# Patient Record
Sex: MALE | Race: WHITE | Employment: UNEMPLOYED | URBAN - METROPOLITAN AREA
[De-identification: names, ages, dates, MRNs, and addresses within clinical notes are randomized per-mention and may not be internally consistent; named-entity substitution may affect disease eponyms.]

---

## 2023-05-19 ENCOUNTER — APPOINTMENT (OUTPATIENT)
Dept: CT IMAGING | Age: 63
End: 2023-05-19
Payer: OTHER GOVERNMENT

## 2023-05-19 ENCOUNTER — HOSPITAL ENCOUNTER (EMERGENCY)
Age: 63
Discharge: HOME | End: 2023-05-19
Attending: EMERGENCY MEDICINE
Payer: OTHER GOVERNMENT

## 2023-05-19 VITALS
DIASTOLIC BLOOD PRESSURE: 84 MMHG | SYSTOLIC BLOOD PRESSURE: 154 MMHG | HEART RATE: 62 BPM | BODY MASS INDEX: 31.84 KG/M2 | OXYGEN SATURATION: 98 % | WEIGHT: 215 LBS | TEMPERATURE: 98.1 F | RESPIRATION RATE: 20 BRPM | HEIGHT: 69 IN

## 2023-05-19 DIAGNOSIS — S01.01XA LACERATION OF SCALP, INITIAL ENCOUNTER: Primary | ICD-10-CM

## 2023-05-19 PROCEDURE — 99284 EMERGENCY DEPT VISIT MOD MDM: CPT

## 2023-05-19 PROCEDURE — 90715 TDAP VACCINE 7 YRS/> IM: CPT | Performed by: PHYSICIAN ASSISTANT

## 2023-05-19 PROCEDURE — 6360000002 HC RX W HCPCS: Performed by: PHYSICIAN ASSISTANT

## 2023-05-19 PROCEDURE — 6370000000 HC RX 637 (ALT 250 FOR IP)

## 2023-05-19 PROCEDURE — 6370000000 HC RX 637 (ALT 250 FOR IP): Performed by: PHYSICIAN ASSISTANT

## 2023-05-19 PROCEDURE — 90471 IMMUNIZATION ADMIN: CPT | Performed by: PHYSICIAN ASSISTANT

## 2023-05-19 RX ORDER — GINSENG 100 MG
CAPSULE ORAL ONCE
Status: COMPLETED | OUTPATIENT
Start: 2023-05-19 | End: 2023-05-19

## 2023-05-19 RX ADMIN — Medication 3 ML: at 14:21

## 2023-05-19 RX ADMIN — TETANUS TOXOID, REDUCED DIPHTHERIA TOXOID AND ACELLULAR PERTUSSIS VACCINE, ADSORBED 0.5 ML: 5; 2.5; 8; 8; 2.5 SUSPENSION INTRAMUSCULAR at 14:54

## 2023-05-19 RX ADMIN — BACITRACIN: 500 OINTMENT TOPICAL at 14:55

## 2023-05-19 ASSESSMENT — PAIN - FUNCTIONAL ASSESSMENT: PAIN_FUNCTIONAL_ASSESSMENT: 0-10

## 2023-05-19 ASSESSMENT — PAIN SCALES - GENERAL: PAINLEVEL_OUTOF10: 1

## 2023-05-19 NOTE — DISCHARGE INSTRUCTIONS
PLEASE RETURN TO THE EMERGENCY DEPARTMENT IMMEDIATELY if your symptoms worsen in anyway. You should immediately return to the ER for symptoms such as increasing pain, fever, drainage from the wound, warmth or redness around the cut, increasing swelling, numbness or weakness to the extremity, color change or coldness to the extremity    STAPLES: Please keep the wound dry for the first 24-48 hours, however, you may carefully rinse around the wound with water to remove blood or dirt, but refrain from washing the wound directly or immersing the wound under water. After this timeframe, you can gently clean around the wound with only soap/shampoo and water 1-2 times per day and pat dry. Do not scrub, pick, or rub the wound. If you have a dressing covering the wound and it gets wet, please dry the wound and change the dressing immediately. Don't use hydrogen peroxide or alcohol, which can slow healing. You may cover the cut with a thin layer of petroleum jelly which may help the staple removal process by minimizing the crusting. Schedule an appointment with your PCP, go to an Urgent Care, or return to the ED in 7 days for suture removal.    You can take Tylenol and ibuprofen over the counter for your pain. I recommend doing this around the clock for the first 2-3 days and then as needed for pain. For improved scar outcome, wear sunscreen or cover the area when in direct sunlight for the next 1 year. Linus Doe!!!    From [facility] ([facility]) and [address] [place] [address] Emergency Services    On behalf of the Emergency Department staff at [facility]), I would like to thank you for giving us the opportunity to address your health care needs and concerns. We hope that during your visit, our service was delivered in a professional and caring manner. Please keep [facility] ([facility]) in mind as we walk with you down the path to your own personal wellness.      Please expect an automated text message or email from us so we can ask a few questions

## 2023-05-20 NOTE — ED PROVIDER NOTES
[address] Emergency Department  [address] [address] [facility] [address] [facility] [address]  Phone: [phone]  Fax: [phone]        Pt Name: Vitor Dominguez  MRN: [id number]  [place] 1960  Date of evaluation: 5/20/23    [address] [facility]       Chief Complaint   Patient presents with    Head Laceration     Patient has head laceration bleeding controlled noted to top of head at this time. Patient was tipping picnic table up to remove blocks from underneath it when wind caused table to tip and strike patient on top of head. HISTORY OF PRESENT ILLNESS (Location/Symptom, Timing/Onset, Context/Setting, Quality, Duration, Modifying Factors, Severity)      Vitor Dominguez is a 65 y.o. male with no pertinent PMH who presents to the ED via private auto after a head injury. Patient reports that    Denies any abrasions, lacerations, or bleeding. Denies any additional exacerbating or alleviating factors. Denies taking any medication for the pain. Denies use of blood thinners. Denies fever, chills, recent illness, neck pain or stiffness, vision changes, photophobia, phonophobia, cough, congestion, facial pain/weakness, nausea, vomiting, diarrhea, abdominal pain, syncope, extremity weakness, numbness, paresthesias, or any other complaints at this time. PAST MEDICAL / SURGICAL / SOCIAL / FAMILY HISTORY     PMH:  has no past medical history on file. Surgical History:  has no past surgical history on file. Social History:    Family History: has no family status information on file. family history is not on file. Psychiatric History: None    Allergies: Patient has no known allergies. Home Medications:   Prior to Admission medications    Not on File       REVIEW OF SYSTEMS  (2-9 systems for level 4, 10 ormore for level 5)      Review of Systems    Constitutional: See HPI. Eyes: See HPI. HENT: See HPI. Respiratory: Denies shortness of breath. Cardiovascular: Denies chest pain.   GI: See
history on file. CURRENT MEDICATIONS       Previous Medications    No medications on file       ALLERGIES     has No Known Allergies. FAMILY HISTORY     has no family status information on file. family history is not on file. SOCIAL HISTORY          PHYSICAL EXAM     INITIAL VITALS:  height is 5' 9\" (1.753 m) and weight is 97.5 kg (215 lb). His oral temperature is 98.1 °F (36.7 °C). His blood pressure is 154/84 (abnormal) and his pulse is 62. His respiration is 20 and oxygen saturation is 98%. DIAGNOSTIC RESULTS     RADIOLOGY:   Non-plain film images such as CT, Ultrasound and MRI are read by the radiologist. Plain radiographic images are visualized and the radiologist interpretations are reviewed as follows: No orders to display       LABS:  No results found for this visit on 05/19/23. EMERGENCY DEPARTMENT COURSE:   Vitals:    Vitals:    05/19/23 1400   BP: (!) 154/84   Pulse: 62   Resp: 20   Temp: 98.1 °F (36.7 °C)   TempSrc: Oral   SpO2: 98%   Weight: 97.5 kg (215 lb)   Height: 5' 9\" (1.753 m)     -------------------------  BP: (!) 154/84, Temp: 98.1 °F (36.7 °C), Pulse: 62, Respirations: 20      PERTINENT ATTENDING PHYSICIAN COMMENTS:    Final diagnosis: head laceration, head injury    This is a nice 71-year-old male that presented to the emergency department with a head laceration. No head injury. The patient was personally seen and evaluated by myself conjunction with Yolanda Molina the physician assistant. The patient will have an update on his tetanus with a Boostrix and will have CT imaging for further evaluation. His head laceration will be cleansed appropriately irrigated and closed with dressing before discharge to home. No other injury sustained. Patient was adamant that he not receive the CAT scan. He understood the risks and benefits.   The wound was closed and he was discharged home with strict instruction return immediately if symptoms worsen or

## 2023-05-26 ENCOUNTER — HOSPITAL ENCOUNTER (EMERGENCY)
Age: 63
Discharge: HOME | End: 2023-05-26
Attending: EMERGENCY MEDICINE
Payer: OTHER GOVERNMENT

## 2023-05-26 VITALS
OXYGEN SATURATION: 96 % | HEIGHT: 68 IN | DIASTOLIC BLOOD PRESSURE: 79 MMHG | HEART RATE: 53 BPM | TEMPERATURE: 98.6 F | SYSTOLIC BLOOD PRESSURE: 148 MMHG | WEIGHT: 217.5 LBS | RESPIRATION RATE: 12 BRPM | BODY MASS INDEX: 32.96 KG/M2

## 2023-05-26 DIAGNOSIS — Z48.02 ENCOUNTER FOR STAPLE REMOVAL: Primary | ICD-10-CM

## 2023-05-26 PROCEDURE — 6370000000 HC RX 637 (ALT 250 FOR IP): Performed by: REGISTERED NURSE

## 2023-05-26 PROCEDURE — 99283 EMERGENCY DEPT VISIT LOW MDM: CPT

## 2023-05-26 RX ORDER — GINSENG 100 MG
CAPSULE ORAL ONCE
Status: COMPLETED | OUTPATIENT
Start: 2023-05-26 | End: 2023-05-26

## 2023-05-26 RX ADMIN — BACITRACIN: 500 OINTMENT TOPICAL at 13:51

## 2023-05-26 ASSESSMENT — ENCOUNTER SYMPTOMS: BACK PAIN: 0

## 2023-05-26 NOTE — DISCHARGE INSTRUCTIONS
PLEASE RETURN TO THE EMERGENCY DEPARTMENT IMMEDIATELY if your symptoms worsen in anyway or in 8-12 hours if not improved for re-evaluation. You should immediately return to the ER for symptoms such as new or worsening pain, fever, visual or hearing changes, stiff neck, rash, a feeling of passing out, chest pain, shortness of breath, persistent nausea and/or vomiting, numbness or weakness to the arms or legs, coolness or color change of the arms or legs. You should avoid contact sports or activities where you might hit your head for a minimum of 1 week. Take your medication as indicated and prescribed. If you are given an antibiotic then, make sure you get the prescription filled and take the antibiotics until finished. Please understand that at this time there is no evidence for a more serious underlying process, but that early in the process of an illness or injury, an emergency department workup can be falsely reassuring. You should contact your family doctor within the next 24 hours for a follow up appointment    Linus Doe!!!    From [facility] and [facility] Emergency Services    On behalf of the Emergency Department staff at [employer], I would like to thank you for giving us the opportunity to address your health care needs and concerns. We hope that during your visit, our service was delivered in a professional and caring manner. Please keep [facility] in mind as we walk with you down the path to your own personal wellness. Please expect an automated text message or email from us so we can ask a few questions about your health and progress. Based on your answers, a clinician may call you back to offer help and instructions. Please understand that early in the process of an illness or injury, an emergency department workup can be falsely reassuring. If you notice any worsening, changing or persistent symptoms please call your family doctor or return to the ER immediately.      Tell

## 2023-05-26 NOTE — ED PROVIDER NOTES
[address] Emergency Department  [address] [address] [facility] [address] [facility] [address]  Phone: [phone]  Fax: [phone]        Pt Name: Nicolás Deshpande  MRN: [id number]  [place] 1960  Date of evaluation: 5/26/23    [address]       Chief Complaint   Patient presents with    Suture / Staple Removal       HISTORY OF PRESENT ILLNESS (Location/Symptom, Timing/Onset, Context/Setting, Quality, Duration, Modifying Factors, Severity)      Nicolás Deshpande is a 65 y.o. male with no pertinent PMH who presents to the ED via private auto for staple removal.  Patient continues seen a week ago where he had 8 staples placed after striking his head on a picnic table. Denies any loss conscious. He denies any dizziness, loss consciousness, vomiting or other symptoms since injury. He denies any bleeding or drainage from the site. On arrival he is resting on the cot comfortably with even unlabored breaths is nontoxic-appearing with no acute distress noted. PAST MEDICAL / SURGICAL / SOCIAL / FAMILY HISTORY     PMH:  has no past medical history on file. Surgical History:  has no past surgical history on file. Social History:  reports that he has never smoked. He has never used smokeless tobacco. He reports that he does not currently use alcohol. Family History: has no family status information on file. family history is not on file. Psychiatric History: None    Allergies: Patient has no known allergies. Home Medications:   Prior to Admission medications    Not on File       REVIEW OF SYSTEMS  (2-9 systems for level 4, 10 ormore for level 5)      Review of Systems   Constitutional:  Negative for chills and fever. Musculoskeletal:  Negative for back pain and neck pain. Skin:  Positive for wound. Negative for pallor. Neurological:  Negative for dizziness and headaches. All other systems negative except as marked.      PHYSICAL EXAM  (up to 7 for level 4, 8 or more for level 5)      INITIAL

## 2023-05-26 NOTE — ED PROVIDER NOTES
[address] [facility] Emergency Department  [address] [address] [place] [address] [address] [address]  Phone: [phone]  Fax: [phone]        BP: (!) 148/79, Temp: 98.6 °F (37 °C), Pulse: 53, Respirations: 12      PERTINENT ATTENDING PHYSICIAN COMMENTS:      Birthdate 1960  Date of evaluation: 5/26/23       Elva Dent is a 65 y.o. male who presents with Suture / Staple Removal      I was personally available for consultation in the Emergency Department. Augustus Liu MD  Attending Emergency Physician  Arielle Fang [phone]      (Please note that portions of this note were completed with a voice recognition program.  Efforts were made to edit the dictations but occasionally words are mis-transcribed.)       (Please note that portions of this note were completed with a voice recognition program.  Efforts were made to edit the dictations but occasionally words are mis-transcribed. )    Jonatan Hinojosa MD,, MD, F.A.C.E.P.   Attending Emergency Medicine Physician        Jairo Kay MD  05/26/23 MD Joss  05/26/23 [id number]